# Patient Record
Sex: MALE | Race: OTHER | HISPANIC OR LATINO | Employment: FULL TIME | URBAN - METROPOLITAN AREA
[De-identification: names, ages, dates, MRNs, and addresses within clinical notes are randomized per-mention and may not be internally consistent; named-entity substitution may affect disease eponyms.]

---

## 2023-07-18 ENCOUNTER — HOSPITAL ENCOUNTER (INPATIENT)
Facility: HOSPITAL | Age: 18
LOS: 1 days | Discharge: HOME | DRG: 684 | End: 2023-07-19
Attending: INTERNAL MEDICINE | Admitting: INTERNAL MEDICINE

## 2023-07-18 DIAGNOSIS — R10.84 GENERALIZED ABDOMINAL PAIN: ICD-10-CM

## 2023-07-18 DIAGNOSIS — R11.2 NAUSEA AND VOMITING, UNSPECIFIED VOMITING TYPE: Primary | ICD-10-CM

## 2023-07-18 DIAGNOSIS — T67.5XXA HEAT EXHAUSTION, INITIAL ENCOUNTER: ICD-10-CM

## 2023-07-18 DIAGNOSIS — N17.9 AKI (ACUTE KIDNEY INJURY): ICD-10-CM

## 2023-07-18 LAB
ALBUMIN SERPL-MCNC: 5.7 G/DL (ref 3.5–5)
ALBUMIN/GLOB SERPL: 1.7 RATIO (ref 1.1–2)
ALP SERPL-CCNC: 91 UNIT/L
ALT SERPL-CCNC: 16 UNIT/L (ref 0–55)
AMPHET UR QL SCN: NEGATIVE
APPEARANCE UR: CLEAR
AST SERPL-CCNC: 17 UNIT/L (ref 5–34)
BACTERIA #/AREA URNS AUTO: ABNORMAL /HPF
BARBITURATE SCN PRESENT UR: NEGATIVE
BASOPHILS # BLD AUTO: 0.07 X10(3)/MCL
BASOPHILS NFR BLD AUTO: 0.4 %
BENZODIAZ UR QL SCN: NEGATIVE
BILIRUB UR QL STRIP.AUTO: ABNORMAL
BILIRUBIN DIRECT+TOT PNL SERPL-MCNC: 0.7 MG/DL (ref 0–?)
BILIRUBIN DIRECT+TOT PNL SERPL-MCNC: 2.7 MG/DL
BUN SERPL-MCNC: 23 MG/DL (ref 8.9–20.6)
CALCIUM SERPL-MCNC: 10.6 MG/DL (ref 8.4–10.2)
CANNABINOIDS UR QL SCN: POSITIVE
CAOX CRY URNS QL MICRO: ABNORMAL /HPF
CHLORIDE SERPL-SCNC: 97 MMOL/L (ref 98–107)
CK SERPL-CCNC: 270 U/L (ref 30–200)
CO2 SERPL-SCNC: 23 MMOL/L (ref 22–29)
COCAINE UR QL SCN: NEGATIVE
COLOR UR: YELLOW
CREAT SERPL-MCNC: 2.68 MG/DL (ref 0.73–1.18)
EOSINOPHIL # BLD AUTO: 0 X10(3)/MCL (ref 0–0.9)
EOSINOPHIL NFR BLD AUTO: 0 %
ERYTHROCYTE [DISTWIDTH] IN BLOOD BY AUTOMATED COUNT: 12.4 % (ref 11.5–17)
FENTANYL UR QL SCN: NEGATIVE
GFR SERPLBLD CREATININE-BSD FMLA CKD-EPI: 34 MLS/MIN/1.73/M2
GLOBULIN SER-MCNC: 3.4 GM/DL (ref 2.4–3.5)
GLUCOSE SERPL-MCNC: 151 MG/DL (ref 74–100)
GLUCOSE UR QL STRIP.AUTO: NEGATIVE
GRAN CASTS URNS QL MICRO: ABNORMAL /LPF
HCT VFR BLD AUTO: 50.6 % (ref 42–52)
HGB BLD-MCNC: 17.9 G/DL (ref 14–18)
HYALINE CASTS URNS QL MICRO: ABNORMAL /LPF
IMM GRANULOCYTES # BLD AUTO: 0.09 X10(3)/MCL (ref 0–0.04)
IMM GRANULOCYTES NFR BLD AUTO: 0.5 %
KETONES UR QL STRIP.AUTO: 15
LEUKOCYTE ESTERASE UR QL STRIP.AUTO: NEGATIVE
LIPASE SERPL-CCNC: 31 U/L
LYMPHOCYTES # BLD AUTO: 0.76 X10(3)/MCL (ref 0.6–4.6)
LYMPHOCYTES NFR BLD AUTO: 4.2 %
MCH RBC QN AUTO: 28.7 PG (ref 27–31)
MCHC RBC AUTO-ENTMCNC: 35.4 G/DL (ref 33–36)
MCV RBC AUTO: 81.2 FL (ref 80–94)
MDMA UR QL SCN: NEGATIVE
MONOCYTES # BLD AUTO: 0.52 X10(3)/MCL (ref 0.1–1.3)
MONOCYTES NFR BLD AUTO: 2.9 %
NEUTROPHILS # BLD AUTO: 16.77 X10(3)/MCL (ref 2.1–9.2)
NEUTROPHILS NFR BLD AUTO: 92 %
NITRITE UR QL STRIP.AUTO: NEGATIVE
NRBC BLD AUTO-RTO: 0 %
OPIATES UR QL SCN: NEGATIVE
PCP UR QL: NEGATIVE
PH UR STRIP.AUTO: 5.5 [PH]
PH UR: 5.5 [PH] (ref 3–11)
PLATELET # BLD AUTO: 249 X10(3)/MCL (ref 130–400)
PMV BLD AUTO: 10.1 FL (ref 7.4–10.4)
POTASSIUM SERPL-SCNC: 4.1 MMOL/L (ref 3.5–5.1)
PROT SERPL-MCNC: 9.1 GM/DL (ref 6.4–8.3)
PROT UR QL STRIP.AUTO: 100
RBC # BLD AUTO: 6.23 X10(6)/MCL (ref 4.7–6.1)
RBC #/AREA URNS AUTO: ABNORMAL /HPF
RBC UR QL AUTO: ABNORMAL
SODIUM SERPL-SCNC: 140 MMOL/L (ref 136–145)
SP GR UR STRIP.AUTO: 1.02
SQUAMOUS #/AREA URNS AUTO: ABNORMAL /HPF
UROBILINOGEN UR STRIP-ACNC: 0.2
WBC # SPEC AUTO: 18.21 X10(3)/MCL (ref 4.5–11.5)
WBC #/AREA URNS AUTO: ABNORMAL /HPF

## 2023-07-18 PROCEDURE — 81003 URINALYSIS AUTO W/O SCOPE: CPT

## 2023-07-18 PROCEDURE — 82248 BILIRUBIN DIRECT: CPT

## 2023-07-18 PROCEDURE — 25500020 PHARM REV CODE 255

## 2023-07-18 PROCEDURE — 80053 COMPREHEN METABOLIC PANEL: CPT

## 2023-07-18 PROCEDURE — 83690 ASSAY OF LIPASE: CPT

## 2023-07-18 PROCEDURE — 80307 DRUG TEST PRSMV CHEM ANLYZR: CPT

## 2023-07-18 PROCEDURE — 82550 ASSAY OF CK (CPK): CPT

## 2023-07-18 PROCEDURE — 99285 EMERGENCY DEPT VISIT HI MDM: CPT | Mod: 25

## 2023-07-18 PROCEDURE — 63600175 PHARM REV CODE 636 W HCPCS: Performed by: STUDENT IN AN ORGANIZED HEALTH CARE EDUCATION/TRAINING PROGRAM

## 2023-07-18 PROCEDURE — 96374 THER/PROPH/DIAG INJ IV PUSH: CPT

## 2023-07-18 PROCEDURE — 11000001 HC ACUTE MED/SURG PRIVATE ROOM

## 2023-07-18 PROCEDURE — 85025 COMPLETE CBC W/AUTO DIFF WBC: CPT

## 2023-07-18 PROCEDURE — 25000003 PHARM REV CODE 250

## 2023-07-18 PROCEDURE — 63600175 PHARM REV CODE 636 W HCPCS

## 2023-07-18 PROCEDURE — 96361 HYDRATE IV INFUSION ADD-ON: CPT

## 2023-07-18 RX ORDER — SODIUM CHLORIDE, SODIUM LACTATE, POTASSIUM CHLORIDE, CALCIUM CHLORIDE 600; 310; 30; 20 MG/100ML; MG/100ML; MG/100ML; MG/100ML
1000 INJECTION, SOLUTION INTRAVENOUS
Status: COMPLETED | OUTPATIENT
Start: 2023-07-18 | End: 2023-07-19

## 2023-07-18 RX ORDER — SODIUM CHLORIDE 9 MG/ML
1000 INJECTION, SOLUTION INTRAVENOUS
Status: COMPLETED | OUTPATIENT
Start: 2023-07-18 | End: 2023-07-18

## 2023-07-18 RX ORDER — SODIUM CHLORIDE 9 MG/ML
1000 INJECTION, SOLUTION INTRAVENOUS
Status: DISCONTINUED | OUTPATIENT
Start: 2023-07-18 | End: 2023-07-18

## 2023-07-18 RX ORDER — ONDANSETRON 2 MG/ML
4 INJECTION INTRAMUSCULAR; INTRAVENOUS
Status: COMPLETED | OUTPATIENT
Start: 2023-07-18 | End: 2023-07-18

## 2023-07-18 RX ADMIN — SODIUM CHLORIDE, POTASSIUM CHLORIDE, SODIUM LACTATE AND CALCIUM CHLORIDE 1000 ML: 600; 310; 30; 20 INJECTION, SOLUTION INTRAVENOUS at 10:07

## 2023-07-18 RX ADMIN — IOHEXOL 100 ML: 350 INJECTION, SOLUTION INTRAVENOUS at 09:07

## 2023-07-18 RX ADMIN — SODIUM CHLORIDE 1000 ML: 9 INJECTION, SOLUTION INTRAVENOUS at 09:07

## 2023-07-18 RX ADMIN — ONDANSETRON 4 MG: 2 INJECTION INTRAMUSCULAR; INTRAVENOUS at 09:07

## 2023-07-18 RX ADMIN — SODIUM CHLORIDE 1000 ML: 9 INJECTION, SOLUTION INTRAVENOUS at 10:07

## 2023-07-18 NOTE — Clinical Note
Diagnosis: LOUANN (acute kidney injury) [[id number]]   Admitting Provider:: DEA DE PAZ [[id number]]   Future Attending Provider: DEA DE PAZ [[id number]]   Reason for IP Medical Treatment  (Clinical interventions that can only be accomplished in the IP setting? ) :: Evaluation and treatment of LOUANN   I certify that Inpatient services for greater than or equal to 2 midnights are medically necessary:: Yes   Plans for Post-Acute care--if anticipated (pick the single best option):: A. No post acute care anticipated at this time   Special Needs:: No Special Needs [1]

## 2023-07-19 VITALS
TEMPERATURE: 98 F | HEIGHT: 67 IN | DIASTOLIC BLOOD PRESSURE: 67 MMHG | WEIGHT: 160 LBS | HEART RATE: 74 BPM | RESPIRATION RATE: 18 BRPM | SYSTOLIC BLOOD PRESSURE: 128 MMHG | OXYGEN SATURATION: 97 % | BODY MASS INDEX: 25.11 KG/M2

## 2023-07-19 PROBLEM — R11.2 NAUSEA & VOMITING: Status: ACTIVE | Noted: 2023-07-19

## 2023-07-19 PROBLEM — T67.5XXA HEAT EXHAUSTION: Status: ACTIVE | Noted: 2023-07-19

## 2023-07-19 PROBLEM — D72.829 LEUKOCYTOSIS: Status: ACTIVE | Noted: 2023-07-19

## 2023-07-19 PROBLEM — N17.9 AKI (ACUTE KIDNEY INJURY): Status: ACTIVE | Noted: 2023-07-19

## 2023-07-19 LAB
ALBUMIN SERPL-MCNC: 4.2 G/DL (ref 3.5–5)
ALBUMIN/GLOB SERPL: 1.8 RATIO (ref 1.1–2)
ALP SERPL-CCNC: 65 UNIT/L
ALT SERPL-CCNC: 11 UNIT/L (ref 0–55)
AST SERPL-CCNC: 15 UNIT/L (ref 5–34)
BASOPHILS # BLD AUTO: 0.06 X10(3)/MCL
BASOPHILS NFR BLD AUTO: 0.5 %
BILIRUBIN DIRECT+TOT PNL SERPL-MCNC: 1.9 MG/DL
BUN SERPL-MCNC: 16.9 MG/DL (ref 8.9–20.6)
CALCIUM SERPL-MCNC: 9.1 MG/DL (ref 8.4–10.2)
CHLORIDE SERPL-SCNC: 106 MMOL/L (ref 98–107)
CK SERPL-CCNC: 371 U/L (ref 30–200)
CO2 SERPL-SCNC: 26 MMOL/L (ref 22–29)
CREAT SERPL-MCNC: 1.28 MG/DL (ref 0.73–1.18)
EOSINOPHIL # BLD AUTO: 0.05 X10(3)/MCL (ref 0–0.9)
EOSINOPHIL NFR BLD AUTO: 0.4 %
ERYTHROCYTE [DISTWIDTH] IN BLOOD BY AUTOMATED COUNT: 12.6 % (ref 11.5–17)
GFR SERPLBLD CREATININE-BSD FMLA CKD-EPI: >60 MLS/MIN/1.73/M2
GLOBULIN SER-MCNC: 2.4 GM/DL (ref 2.4–3.5)
GLUCOSE SERPL-MCNC: 102 MG/DL (ref 74–100)
HCT VFR BLD AUTO: 42.2 % (ref 42–52)
HGB BLD-MCNC: 14.6 G/DL (ref 14–18)
IMM GRANULOCYTES # BLD AUTO: 0.04 X10(3)/MCL (ref 0–0.04)
IMM GRANULOCYTES NFR BLD AUTO: 0.3 %
LYMPHOCYTES # BLD AUTO: 2.29 X10(3)/MCL (ref 0.6–4.6)
LYMPHOCYTES NFR BLD AUTO: 17.4 %
MCH RBC QN AUTO: 29.2 PG (ref 27–31)
MCHC RBC AUTO-ENTMCNC: 34.6 G/DL (ref 33–36)
MCV RBC AUTO: 84.4 FL (ref 80–94)
MONOCYTES # BLD AUTO: 1.25 X10(3)/MCL (ref 0.1–1.3)
MONOCYTES NFR BLD AUTO: 9.5 %
NEUTROPHILS # BLD AUTO: 9.49 X10(3)/MCL (ref 2.1–9.2)
NEUTROPHILS NFR BLD AUTO: 71.9 %
NRBC BLD AUTO-RTO: 0 %
PLATELET # BLD AUTO: 211 X10(3)/MCL (ref 130–400)
PMV BLD AUTO: 10.1 FL (ref 7.4–10.4)
POTASSIUM SERPL-SCNC: 3.6 MMOL/L (ref 3.5–5.1)
PROT SERPL-MCNC: 6.6 GM/DL (ref 6.4–8.3)
RBC # BLD AUTO: 5 X10(6)/MCL (ref 4.7–6.1)
SODIUM SERPL-SCNC: 142 MMOL/L (ref 136–145)
WBC # SPEC AUTO: 13.18 X10(3)/MCL (ref 4.5–11.5)

## 2023-07-19 PROCEDURE — 63600175 PHARM REV CODE 636 W HCPCS: Performed by: INTERNAL MEDICINE

## 2023-07-19 PROCEDURE — 82550 ASSAY OF CK (CPK): CPT | Performed by: STUDENT IN AN ORGANIZED HEALTH CARE EDUCATION/TRAINING PROGRAM

## 2023-07-19 PROCEDURE — 80053 COMPREHEN METABOLIC PANEL: CPT

## 2023-07-19 PROCEDURE — 96376 TX/PRO/DX INJ SAME DRUG ADON: CPT

## 2023-07-19 PROCEDURE — 25000003 PHARM REV CODE 250: Performed by: INTERNAL MEDICINE

## 2023-07-19 PROCEDURE — 96361 HYDRATE IV INFUSION ADD-ON: CPT

## 2023-07-19 PROCEDURE — 85025 COMPLETE CBC W/AUTO DIFF WBC: CPT

## 2023-07-19 RX ORDER — ONDANSETRON 2 MG/ML
4 INJECTION INTRAMUSCULAR; INTRAVENOUS EVERY 6 HOURS PRN
Status: DISCONTINUED | OUTPATIENT
Start: 2023-07-19 | End: 2023-07-19 | Stop reason: HOSPADM

## 2023-07-19 RX ORDER — OMEPRAZOLE 40 MG/1
40 CAPSULE, DELAYED RELEASE ORAL
Qty: 60 CAPSULE | Refills: 11 | Status: SHIPPED | OUTPATIENT
Start: 2023-07-19 | End: 2024-07-18

## 2023-07-19 RX ORDER — HYDROCODONE BITARTRATE AND ACETAMINOPHEN 5; 325 MG/1; MG/1
1 TABLET ORAL EVERY 6 HOURS PRN
Status: DISCONTINUED | OUTPATIENT
Start: 2023-07-19 | End: 2023-07-19 | Stop reason: HOSPADM

## 2023-07-19 RX ORDER — SODIUM CHLORIDE 9 MG/ML
INJECTION, SOLUTION INTRAVENOUS CONTINUOUS
Status: DISCONTINUED | OUTPATIENT
Start: 2023-07-19 | End: 2023-07-19 | Stop reason: HOSPADM

## 2023-07-19 RX ADMIN — ONDANSETRON 4 MG: 2 INJECTION INTRAMUSCULAR; INTRAVENOUS at 01:07

## 2023-07-19 RX ADMIN — HYDROCODONE BITARTRATE AND ACETAMINOPHEN 1 TABLET: 5; 325 TABLET ORAL at 01:07

## 2023-07-19 RX ADMIN — SODIUM CHLORIDE: 9 INJECTION, SOLUTION INTRAVENOUS at 07:07

## 2023-07-19 RX ADMIN — HYDROCODONE BITARTRATE AND ACETAMINOPHEN 1 TABLET: 5; 325 TABLET ORAL at 07:07

## 2023-07-19 NOTE — PROVIDER PROGRESS NOTES - EMERGENCY DEPT.
Encounter Date: 7/18/2023    ED Physician Progress Notes          Critical Care    Date/Time: 7/18/2023 11:21 PM  Performed by: Joshua Soto MD  Authorized by: Joshua Soto MD   Direct patient critical care time: 45 minutes  Total critical care time (exclusive of procedural time) : 45 minutes  Critical care time was exclusive of separately billable procedures and treating other patients.  Critical care was necessary to treat or prevent imminent or life-threatening deterioration of the following conditions: dehydration and renal failure.  Critical care was time spent personally by me on the following activities: development of treatment plan with patient or surrogate, evaluation of patient's response to treatment, examination of patient, obtaining history from patient or surrogate, ordering and performing treatments and interventions, ordering and review of laboratory studies, ordering and review of radiographic studies and re-evaluation of patient's condition.

## 2023-07-19 NOTE — DISCHARGE SUMMARY
"  Hospital Medicine Discharge Summary    Admit Date: 7/18/2023  Discharge Date and Time: 7/19/20231:14 PM  Admitting Physician: Mandy Ochoa MD   Discharge Attending Physician: Danyel Verdin.  Primary Care Physician: Primary Doctor No  Consults: Dr. Crystal    Discharge Diagnoses:  Active Hospital Problems    Diagnosis  POA    *Heat exhaustion [T67.5XXA]  Yes    LOUANN (acute kidney injury) [N17.9]  Yes    Leukocytosis [D72.829]  Yes    Nausea & vomiting [R11.2]  Yes      Resolved Hospital Problems   No resolved problems to display.        Hospital Course:   Pt admitted with on/off nausea vomiting x1m, heartburn and spasm in stomach usually in morning.  He works usually inside but has been working outside and along with vomiting got overheated not drinking water.  In ED elevated cr, and workup with ct abd with contrast done.  Pt started on ivfs and cr improved.  Pt feels good, will start a ppi and instruct to eat bland diet and avoid foods that aggravate.  Pt also instructed to avoid overheating and drink plenty of water throughout the day.    /67 (BP Location: Left arm, Patient Position: Lying)   Pulse 74   Temp 98.2 °F (36.8 °C)   Resp 18   Ht 5' 7" (1.702 m)   Wt 72.6 kg (160 lb)   SpO2 97%   BMI 25.06 kg/m²    Physical Exam  Cardiovascular:      Rate and Rhythm: Normal rate and regular rhythm.      Heart sounds: Normal heart sounds.   Pulmonary:      Breath sounds: Normal breath sounds.   Abdominal:      General: Bowel sounds are normal.      Palpations: Abdomen is soft.   Musculoskeletal:      Cervical back: Neck supple.   Neurological:      General: No focal deficit present.      Mental Status: He is alert.   Psychiatric:         Mood and Affect: Mood normal.        Procedures Performed: No admission procedures for hospital encounter.     Significant Diagnostic Studies: See Full reports for all details  Admission on 07/18/2023   Component Date Value Ref Range Status    Sodium Level 07/18/2023 140  " 136 - 145 mmol/L Final    Potassium Level 07/18/2023 4.1  3.5 - 5.1 mmol/L Final    Chloride 07/18/2023 97 (L)  98 - 107 mmol/L Final    Carbon Dioxide 07/18/2023 23  22 - 29 mmol/L Final    Glucose Level 07/18/2023 151 (H)  74 - 100 mg/dL Final    Blood Urea Nitrogen 07/18/2023 23.0 (H)  8.9 - 20.6 mg/dL Final    Creatinine 07/18/2023 2.68 (H)  0.73 - 1.18 mg/dL Final    Calcium Level Total 07/18/2023 10.6 (H)  8.4 - 10.2 mg/dL Final    Protein Total 07/18/2023 9.1 (H)  6.4 - 8.3 gm/dL Final    Albumin Level 07/18/2023 5.7 (H)  3.5 - 5.0 g/dL Final    Globulin 07/18/2023 3.4  2.4 - 3.5 gm/dL Final    Albumin/Globulin Ratio 07/18/2023 1.7  1.1 - 2.0 ratio Final    Bilirubin Total 07/18/2023 2.7 (H)  <=1.5 mg/dL Final    Alkaline Phosphatase 07/18/2023 91  <=750 unit/L Final    Alanine Aminotransferase 07/18/2023 16  0 - 55 unit/L Final    Aspartate Aminotransferase 07/18/2023 17  5 - 34 unit/L Final    eGFR 07/18/2023 34  mls/min/1.73/m2 Final    Lipase Level 07/18/2023 31  <=60 U/L Final    Color, UA 07/18/2023 Yellow  Yellow, Light-Yellow, Dark Yellow, Aileen, Straw Final    Appearance, UA 07/18/2023 Clear  Clear Final    Specific Gravity, UA 07/18/2023 1.020   Final    pH, UA 07/18/2023 5.5  5.0 - 8.5 Final    Protein, UA 07/18/2023 100 (A)  Negative Final    Glucose, UA 07/18/2023 Negative  Negative, Normal Final    Ketones, UA 07/18/2023 15 (A)  Negative Final    Blood, UA 07/18/2023 Trace (A)  Negative Final    Bilirubin, UA 07/18/2023 Small (A)  Negative Final    Urobilinogen, UA 07/18/2023 0.2  0.2, 1.0, Normal Final    Nitrites, UA 07/18/2023 Negative  Negative Final    Leukocyte Esterase, UA 07/18/2023 Negative  Negative Final    WBC 07/18/2023 18.21 (H)  4.50 - 11.50 x10(3)/mcL Final    RBC 07/18/2023 6.23 (H)  4.70 - 6.10 x10(6)/mcL Final    Hgb 07/18/2023 17.9  14.0 - 18.0 g/dL Final    Hct 07/18/2023 50.6  42.0 - 52.0 % Final    MCV 07/18/2023 81.2  80.0 - 94.0 fL Final    MCH 07/18/2023 28.7  27.0 -  31.0 pg Final    MCHC 07/18/2023 35.4  33.0 - 36.0 g/dL Final    RDW 07/18/2023 12.4  11.5 - 17.0 % Final    Platelet 07/18/2023 249  130 - 400 x10(3)/mcL Final    MPV 07/18/2023 10.1  7.4 - 10.4 fL Final    Neut % 07/18/2023 92.0  % Final    Lymph % 07/18/2023 4.2  % Final    Mono % 07/18/2023 2.9  % Final    Eos % 07/18/2023 0.0  % Final    Basophil % 07/18/2023 0.4  % Final    Lymph # 07/18/2023 0.76  0.6 - 4.6 x10(3)/mcL Final    Neut # 07/18/2023 16.77 (H)  2.1 - 9.2 x10(3)/mcL Final    Mono # 07/18/2023 0.52  0.1 - 1.3 x10(3)/mcL Final    Eos # 07/18/2023 0.00  0 - 0.9 x10(3)/mcL Final    Baso # 07/18/2023 0.07  <=0.2 x10(3)/mcL Final    IG# 07/18/2023 0.09 (H)  0 - 0.04 x10(3)/mcL Final    IG% 07/18/2023 0.5  % Final    NRBC% 07/18/2023 0.0  % Final    Creatine Kinase 07/18/2023 270 (H)  30 - 200 U/L Final    Bilirubin Direct 07/18/2023 0.7 (H)  0.0 - <0.5 mg/dL Final    Amphetamines, Urine 07/18/2023 Negative  Negative Final    Barbituates, Urine 07/18/2023 Negative  Negative Final    Benzodiazepine, Urine 07/18/2023 Negative  Negative Final    Cannabinoids, Urine 07/18/2023 Positive (A)  Negative Final    Cocaine, Urine 07/18/2023 Negative  Negative Final    Fentanyl, Urine 07/18/2023 Negative  Negative Final    MDMA, Urine 07/18/2023 Negative  Negative Final    Opiates, Urine 07/18/2023 Negative  Negative Final    Phencyclidine, Urine 07/18/2023 Negative  Negative Final    pH, Urine 07/18/2023 5.5  3.0 - 11.0 Final    Bacteria, UA 07/18/2023 Rare  None Seen, Rare, Occasional /HPF Final    Hyaline Casts, UA 07/18/2023 Few (A)  None Seen /LPF Final    Calcium Oxalate Crystals, UA 07/18/2023 Few (A)  None Seen /HPF Final    Granular Casts, UA 07/18/2023 Few (A)  None Seen /LPF Final    RBC, UA 07/18/2023 3-5  None Seen, 0-2, 3-5, 0-5 /HPF Final    WBC, UA 07/18/2023 3-5  None Seen, 0-2, 3-5, 0-5 /HPF Final    Squamous Epithelial Cells, UA 07/18/2023 Few (A)  None Seen, Rare, Occasional, Occ /HPF Final     Sodium Level 07/19/2023 142  136 - 145 mmol/L Final    Potassium Level 07/19/2023 3.6  3.5 - 5.1 mmol/L Final    Chloride 07/19/2023 106  98 - 107 mmol/L Final    Carbon Dioxide 07/19/2023 26  22 - 29 mmol/L Final    Glucose Level 07/19/2023 102 (H)  74 - 100 mg/dL Final    Blood Urea Nitrogen 07/19/2023 16.9  8.9 - 20.6 mg/dL Final    Creatinine 07/19/2023 1.28 (H)  0.73 - 1.18 mg/dL Final    Calcium Level Total 07/19/2023 9.1  8.4 - 10.2 mg/dL Final    Protein Total 07/19/2023 6.6  6.4 - 8.3 gm/dL Final    Albumin Level 07/19/2023 4.2  3.5 - 5.0 g/dL Final    Globulin 07/19/2023 2.4  2.4 - 3.5 gm/dL Final    Albumin/Globulin Ratio 07/19/2023 1.8  1.1 - 2.0 ratio Final    Bilirubin Total 07/19/2023 1.9 (H)  <=1.5 mg/dL Final    Alkaline Phosphatase 07/19/2023 65  <=750 unit/L Final    Alanine Aminotransferase 07/19/2023 11  0 - 55 unit/L Final    Aspartate Aminotransferase 07/19/2023 15  5 - 34 unit/L Final    eGFR 07/19/2023 >60  mls/min/1.73/m2 Final    Creatine Kinase 07/19/2023 371 (H)  30 - 200 U/L Final    WBC 07/19/2023 13.18 (H)  4.50 - 11.50 x10(3)/mcL Final    RBC 07/19/2023 5.00  4.70 - 6.10 x10(6)/mcL Final    Hgb 07/19/2023 14.6  14.0 - 18.0 g/dL Final    Hct 07/19/2023 42.2  42.0 - 52.0 % Final    MCV 07/19/2023 84.4  80.0 - 94.0 fL Final    MCH 07/19/2023 29.2  27.0 - 31.0 pg Final    MCHC 07/19/2023 34.6  33.0 - 36.0 g/dL Final    RDW 07/19/2023 12.6  11.5 - 17.0 % Final    Platelet 07/19/2023 211  130 - 400 x10(3)/mcL Final    MPV 07/19/2023 10.1  7.4 - 10.4 fL Final    Neut % 07/19/2023 71.9  % Final    Lymph % 07/19/2023 17.4  % Final    Mono % 07/19/2023 9.5  % Final    Eos % 07/19/2023 0.4  % Final    Basophil % 07/19/2023 0.5  % Final    Lymph # 07/19/2023 2.29  0.6 - 4.6 x10(3)/mcL Final    Neut # 07/19/2023 9.49 (H)  2.1 - 9.2 x10(3)/mcL Final    Mono # 07/19/2023 1.25  0.1 - 1.3 x10(3)/mcL Final    Eos # 07/19/2023 0.05  0 - 0.9 x10(3)/mcL Final    Baso # 07/19/2023 0.06  <=0.2 x10(3)/mcL  Final    IG# 07/19/2023 0.04  0 - 0.04 x10(3)/mcL Final    IG% 07/19/2023 0.3  % Final    NRBC% 07/19/2023 0.0  % Final        CT Abdomen Pelvis With Contrast    Result Date: 7/18/2023  EXAMINATION: CT ABDOMEN PELVIS WITH CONTRAST CLINICAL HISTORY: Nausea/vomiting; TECHNIQUE: Multidetector IV contrast enhanced axial CT images of the abdomen and pelvis were obtained with coronal and sagittal reconstructions. Automatic exposure control was utilized to reduce the patient's radiation dose. DLP= 239 COMPARISON: No prior imaging available for comparison. FINDINGS: 01. HEPATOBILIARY: No focal hepatic lesion is identified, The gallbladder is normal. 02. SPLEEN: Normal 03. PANCREAS: No focal masses or ductal dilatation. 04. ADRENALS: No adrenal nodules. 05. KIDNEYS: The right kidney demonstrates no stone, hydronephrosis, or hydroureter. No focal mass identified. The left kidney demonstrates no stone, hydronephrosis, or hydroureter. No focal mass identified. 06. LYMPHADENOPATHY/RETROPERITONEUM: There is no retroperitoneal lymphadenopathy. The abdominal aorta is normal in course and caliber. 07. BOWEL: No acute bowel related abnormalities. No evidence of appendiceal inflammation. 08. PELVIC VISCERA: Normal. No pelvic mass. 09. PELVIC LYMPH NODES: No lymphadenopathy. 10. PERITONEUM/ABDOMINAL WALL: No ascites or implant. 11. SKELETAL: No aggressive appearing lytic/blastic lesion. No acute fractures, subluxations or dislocations. 12. LUNG BASES: The visualized lungs are unremarkable.     No acute abnormality identified within the abdomen and pelvis. Electronically signed by: Brannon Benitez Date:    07/18/2023 Time:    21:46      Microbiology Results (last 7 days)       ** No results found for the last 168 hours. **                Medication List        START taking these medications      omeprazole 40 MG capsule  Commonly known as: PRILOSEC  Take 1 capsule (40 mg total) by mouth 2 (two) times daily before meals.                Where to Get Your Medications        These medications were sent to [id number] - [place], [address] AT [facility] & [facility]  [address] [place], [place] [address] [address]      Hours: 24-hours Phone: [phone]   omeprazole 40 MG capsule         Explained in detail to the patient about the discharge plan, medications, and follow-up visits. Pt understands and agrees with the treatment plan  Discharged Condition: stable  Medications Per DC med rec  Activities as tolerated  Follow-up pcp  For further questions contact hospitalist office    Discharge time 30 minutes    For worsening symptoms, chest pain, shortness of breath, increased abdominal pain, high grade fever, stroke or stroke like symptoms, immediately go to the nearest Emergency Room or call 911 as soon as possible.      Danyel Warren M.D, on 7/19/2023. at 1:14 PM.

## 2023-07-19 NOTE — ED PROVIDER NOTES
Encounter Date: 7/18/2023       History     Chief Complaint   Patient presents with    Nausea    Vomiting    Abdominal Pain     Pt to er with n/v x 1 month. Abd pain that started today      17 y.o. male presents to Emergency Department with a chief complaint of vomiting. Symptoms began 1 month ago and have been recurrent since onset. States on today he could not hold any food or liquids down. Associated symptoms include nausea and abdominal pain. Symptoms are aggravated with palpation and there are no alleviating factors. The patient denies CP, SOB, fever, chills, dizziness, or diarrhea. No other reported symptoms at this time      The history is provided by the patient. No  was used.   Emesis   This is a recurrent problem. The current episode started several weeks ago. The problem occurs most days. The problem has been gradually worsening. The emesis has an appearance of stomach contents and bilious material. Associated symptoms include abdominal pain. Pertinent negatives include no chills, no cough, no fever, no headaches and no URI.   Review of patient's allergies indicates:  No Known Allergies  History reviewed. No pertinent past medical history.  History reviewed. No pertinent surgical history.  History reviewed. No pertinent family history.  Social History     Tobacco Use    Smoking status: Every Day    Smokeless tobacco: Never   Substance Use Topics    Alcohol use: Yes    Drug use: Yes     Types: Marijuana     Review of Systems   Constitutional:  Negative for chills, diaphoresis, fatigue and fever.   Respiratory:  Negative for cough, shortness of breath, wheezing and stridor.    Cardiovascular:  Negative for chest pain, palpitations and leg swelling.   Gastrointestinal:  Positive for abdominal pain, nausea and vomiting.   Neurological:  Negative for dizziness, syncope, weakness and headaches.   All other systems reviewed and are negative.    Physical Exam     Initial Vitals [07/18/23 2032]    BP Pulse Resp Temp SpO2   138/84 94 20 98.4 °F (36.9 °C) 98 %      MAP       --         Physical Exam    Nursing note and vitals reviewed.  Constitutional: He appears well-developed and well-nourished. He is not diaphoretic. He is cooperative.  Non-toxic appearance. No distress.   No active vomiting noted.   HENT:   Head: Normocephalic and atraumatic.   Right Ear: External ear normal.   Left Ear: External ear normal.   Nose: Nose normal.   Mouth/Throat: Oropharynx is clear and moist. No oropharyngeal exudate.   Eyes: Conjunctivae and EOM are normal. Pupils are equal, round, and reactive to light.   Neck: Neck supple.   Normal range of motion.  Cardiovascular:  Normal rate, regular rhythm, S1 normal, S2 normal, normal heart sounds, intact distal pulses and normal pulses.           Pulmonary/Chest: Effort normal and breath sounds normal. No respiratory distress. He has no wheezes. He exhibits no tenderness.   Abdominal: Abdomen is soft. Bowel sounds are normal. He exhibits no distension. There is generalized abdominal tenderness.   Musculoskeletal:         General: No tenderness or edema. Normal range of motion.      Cervical back: Normal range of motion and neck supple.     Neurological: He is alert and oriented to person, place, and time. He has normal strength. No sensory deficit. GCS score is 15. GCS eye subscore is 4. GCS verbal subscore is 5. GCS motor subscore is 6.   Skin: Skin is warm and dry. Capillary refill takes less than 2 seconds. No rash noted. No erythema.   Psychiatric: He has a normal mood and affect. Thought content normal.       ED Course   Procedures  Labs Reviewed   COMPREHENSIVE METABOLIC PANEL - Abnormal; Notable for the following components:       Result Value    Chloride 97 (*)     Glucose Level 151 (*)     Blood Urea Nitrogen 23.0 (*)     Creatinine 2.68 (*)     Calcium Level Total 10.6 (*)     Protein Total 9.1 (*)     Albumin Level 5.7 (*)     Bilirubin Total 2.7 (*)     All other  components within normal limits   URINALYSIS, REFLEX TO URINE CULTURE - Abnormal; Notable for the following components:    Protein,  (*)     Ketones, UA 15 (*)     Blood, UA Trace (*)     Bilirubin, UA Small (*)     All other components within normal limits   CBC WITH DIFFERENTIAL - Abnormal; Notable for the following components:    WBC 18.21 (*)     RBC 6.23 (*)     Neut # 16.77 (*)     IG# 0.09 (*)     All other components within normal limits   CK - Abnormal; Notable for the following components:    Creatine Kinase 270 (*)     All other components within normal limits   BILIRUBIN, DIRECT - Abnormal; Notable for the following components:    Bilirubin Direct 0.7 (*)     All other components within normal limits   DRUG SCREEN, URINE (BEAKER) - Abnormal; Notable for the following components:    Cannabinoids, Urine Positive (*)     All other components within normal limits    Narrative:     Cut off concentrations:    Amphetamines - 1000 ng/ml  Barbiturates - 200 ng/ml  Benzodiazepine - 200 ng/ml  Cannabinoids (THC) - 50 ng/ml  Cocaine - 300 ng/ml  Fentanyl - 1.0 ng/ml  MDMA - 500 ng/ml  Opiates - 300 ng/ml   Phencyclidine (PCP) - 25 ng/ml    Specimen submitted for drug analysis and tested for pH and specific gravity in order to evaluate sample integrity. Suspect tampering if specific gravity is <1.003 and/or pH is not within the range of 4.5 - 8.0  False negatives may result form substances such as bleach added to urine.  False positives may result for the presence of a substance with similar chemical structure to the drug or its metabolite.    This test provides only a PRELIMINARY analytical test result. A more specific alternate chemical method must be used in order to obtain a confirmed analytical result. Gas chromatography/mass spectrometry (GC/MS) is the preferred confirmatory method. Other chemical confirmation methods are available. Clinical consideration and professional judgement should be applied to  any drug of abuse test result, particularly when preliminary positive results are used.    Positive results will be confirmed only at the physicians request. Unconfirmed screening results are to be used only for medical purposes (treatment).        URINALYSIS, MICROSCOPIC - Abnormal; Notable for the following components:    Hyaline Casts, UA Few (*)     Calcium Oxalate Crystals, UA Few (*)     Granular Casts, UA Few (*)     Squamous Epithelial Cells, UA Few (*)     All other components within normal limits   LIPASE - Normal   CBC W/ AUTO DIFFERENTIAL    Narrative:     The following orders were created for panel order CBC W/ AUTO DIFFERENTIAL.  Procedure                               Abnormality         Status                     ---------                               -----------         ------                     CBC with Differential[[id number]]        Abnormal            Final result                 Please view results for these tests on the individual orders.          Imaging Results              CT Abdomen Pelvis With Contrast (Final result)  Result time 07/18/23 21:46:13      Final result by Brannon Benitez MD (07/18/23 21:46:13)                   Impression:      No acute abnormality identified within the abdomen and pelvis.      Electronically signed by: Brannon Benitez  Date:    07/18/2023  Time:    21:46               Narrative:    EXAMINATION:  CT ABDOMEN PELVIS WITH CONTRAST    CLINICAL HISTORY:  Nausea/vomiting;    TECHNIQUE:  Multidetector IV contrast enhanced axial CT images of the abdomen and pelvis were obtained with coronal and sagittal reconstructions.    Automatic exposure control was utilized to reduce the patient's radiation dose.    DLP= 239    COMPARISON:  No prior imaging available for comparison.    FINDINGS:  01. HEPATOBILIARY: No focal hepatic lesion is identified, The gallbladder is normal.    02. SPLEEN: Normal    03. PANCREAS: No focal masses or ductal dilatation.    04. ADRENALS: No  adrenal nodules.    05. KIDNEYS: The right kidney demonstrates no stone, hydronephrosis, or hydroureter. No focal mass identified. The left kidney demonstrates no stone, hydronephrosis, or hydroureter. No focal mass identified.    06. LYMPHADENOPATHY/RETROPERITONEUM: There is no retroperitoneal lymphadenopathy. The abdominal aorta is normal in course and caliber.    07. BOWEL: No acute bowel related abnormalities. No evidence of appendiceal inflammation.    08. PELVIC VISCERA: Normal. No pelvic mass.    09. PELVIC LYMPH NODES: No lymphadenopathy.    10. PERITONEUM/ABDOMINAL WALL: No ascites or implant.    11. SKELETAL: No aggressive appearing lytic/blastic lesion. No acute fractures, subluxations or dislocations.    12. LUNG BASES: The visualized lungs are unremarkable.                                       Medications   0.9%  NaCl infusion (has no administration in time range)   0.9%  NaCl infusion (0 mLs Intravenous Stopped 7/18/23 2217)   ondansetron injection 4 mg (4 mg Intravenous Given 7/18/23 2107)   0.9%  NaCl infusion (1,000 mLs Intravenous New Bag 7/18/23 2211)   iohexoL (OMNIPAQUE 350) injection 100 mL (100 mLs Intravenous Given 7/18/23 2143)     Medical Decision Making:   Initial Assessment:   Patient awake, alert, has non-labored breathing, and follows commands appropriately. C/o abdominal pain, nausea, and vomiting. Symptoms have been recurrent for 1 month. 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 NAD noted.   Differential Diagnosis:   Viral Gastroenteritis, Nausea and Vomiting, GERD  Clinical Tests:   Lab Tests: Ordered and Reviewed  Radiological Study: Ordered and Reviewed  ED Management:  Co-morbidities and/or factors adding to the complexity or risk for the patient?: none  Differential diagnoses: Viral Gastroenteritis, Nausea and Vomiting, GERD  Decision to obtain previous or outside records?: I did not review records.   Chart Review (nursing home, outside records, CareEverywhere):  none  Review of RX medications/new RX prescribed by me?: none  Labs/imaging/other tests obtained/considered (risk/benefits of testing discussed): cbc, cmp, lipase, ck, t-bilii, ua, uds, ct  Labs/tests intepretation: Leukocytosis noted likely due to excessive vomiting. Kidney enzymes elevated. CK- 270. T-Bili- 2.7. CT- No acute abnormality identified within the abdomen and pelvis. Informed patient of results.   My independent imaging interpretation: none  Treatment/interventions, IV fluids, IV medications: IV fluids and Zofran given in ER.   Complex management (IV controlled substances, went to OR, DNR, meds requiring monitoring, transfer, etc)?: none  Workup/treatment affected by social determinants of health?:none   Point of care US done/interpretation: none  Consults/radiologist/EMS/social work/family discussion/alternate history: none  Advanced care planning/end of life discussion: none  Shared decision making: Discussed plan of care and interventions with patient. Agreed to and aware of plan of care. Comfortable being admitted.   ETOH/smoking/drug cessation discussion: none  Dispo: Patient admitted to hospital medicine services for further evaluation and treatment.     Other:   I discussed test(s) with the performing physician.  I have discussed this case with another health care provider.           ED Course as of 07/18/23 2233 Tue Jul 18, 2023 2120 WBC(!): 18.21 [JA]   2144 Creatinine(!): 2.68 [JA]   2144 BUN(!): 23.0 [JA]   2144 BILIRUBIN TOTAL(!): 2.7 [JA]   2156 Discussed patient and results with Dr. Soto. Instructed to give additional bolus and admit to hospital medicine services for further evaluation.  [JA]   2213 Hospital medicine paged.  [JA]   2232 Discussed patient with ARGELIA Carty, with hospital medicine. Will admit to their services. Aware that maintenance fluids will be started on patient. No further instruction or recommendations given. [JA]      ED Course User Index  [JA] Awilda COLEMAN  ARGELIA Wiley                   Clinical Impression:   Final diagnoses:  [N17.9] LOUANN (acute kidney injury)  [R11.2] Nausea and vomiting, unspecified vomiting type (Primary)  [R10.84] Generalized abdominal pain        ED Disposition Condition    Admit Stable                Awilda Wiley NP  07/18/23 [id number]

## 2023-07-19 NOTE — H&P
[facility] - Emergency Dept    History & Physical      Patient Name: Krishan Sampson  MRN: [id number]  Admission Date: 7/18/2023  Attending Physician: Mandy Ochoa MD   Primary Care Provider: Primary Doctor No         Patient information was obtained from patient and ER records.     Subjective:     Principal Problem:<principal problem not specified>    Chief Complaint:   Chief Complaint   Patient presents with    Nausea    Vomiting    Abdominal Pain     Pt to er with n/v x 1 month. Abd pain that started today         HPI: The patient is a 17-year-old male with no significant past medical history. He was working out in the yard earlier today without adequate hydration. Patient began to feel nauseated and tired. He's not here in the emergency department. He was found to have a creatinine of 2.6 with a white blood cell count of 18,000. He got a CT scan with contrast unfortunately of the abdomen and pelvis. It is unremarkable but we are admitting to watch his kidney function status post receiving contrast. No evidence of acute rhabdomyolysis.    History reviewed. No pertinent past medical history.    History reviewed. No pertinent surgical history.    Review of patient's allergies indicates:  No Known Allergies    No current facility-administered medications on file prior to encounter.     No current outpatient medications on file prior to encounter.     Family History    None       Tobacco Use    Smoking status: Every Day    Smokeless tobacco: Never   Substance and Sexual Activity    Alcohol use: Yes    Drug use: Yes     Types: Marijuana    Sexual activity: Not on file     Comment: daily     Review of Systems 10pt ROS performned and is negative unless otherwise noted  Objective:     Vital Signs (Most Recent):  Temp: 98.4 °F (36.9 °C) (07/18/23 2032)  Pulse: 73 (07/19/23 0133)  Resp: 20 (07/18/23 2032)  BP: (!) 124/54 (07/19/23 0133)  SpO2: 96 % (07/19/23 0133) Vital Signs (24h Range):  Temp:  [98 °F  "(36.7 °C)-98.4 °F (36.9 °C)] 98.4 °F (36.9 °C)  Pulse:  [73-98] 73  Resp:  [16-20] 20  SpO2:  [96 %-100 %] 96 %  BP: (113-149)/(54-89) 124/54     Weight: 72.6 kg (160 lb)  Body mass index is 25.06 kg/m².    Physical Exam   BP (!) 124/54   Pulse 73   Temp 98.4 °F (36.9 °C) (Oral)   Resp 20   Ht 5' 7" (1.702 m)   Wt 72.6 kg (160 lb)   SpO2 96%   BMI 25.06 kg/m²     General Appearance:  Alert, cooperative, no distress, appears stated age   Head:  Normocephalic, without obvious abnormality, atraumatic   Eyes:  PERRL, conjunctiva/corneas clear, EOM's intact, fundi benign, both eyes   Ears:  Normal TM's and external ear canals, both ears   Nose: Nares normal, septum midline, mucosa normal, no drainage or sinus tenderness   Throat: Lips, mucosa, and tongue normal; teeth and gums normal   Neck: Supple, symmetrical, trachea midline, no adenopathy, thyroid: not enlarged, symmetric, no tenderness/mass/nodules, no carotid bruit or JVD   Back:   Symmetric, no curvature, ROM normal, no CVA tenderness   Lungs:   Clear to auscultation bilaterally, respirations unlabored   Chest Wall:  No tenderness or deformity   Heart:  Regular rate and rhythm, S1, S2 normal, no murmur, rub or gallop   Abdomen:   Soft, non-tender, bowel sounds active all four quadrants,  no masses, no organomegaly   Genitalia:  Normal male   Rectal:  Normal tone, normal prostate, no masses or tenderness;  guaiac negative stool   Extremities: Extremities normal, atraumatic, no cyanosis or edema   Pulses: 2+ and symmetric   Skin: Skin color, texture, turgor normal, no rashes or lesions   Lymph nodes: Cervical, supraclavicular, and axillary nodes normal   Neurologic: Normal         Significant Labs: All pertinent labs within the past 24 hours have been reviewed.  Recent Lab Results         07/18/23 2201 07/18/23  2110        Phencyclidine Negative         Albumin/Globulin Ratio   1.7       Albumin   5.7       Alkaline Phosphatase   91       ALT   16       " Amphetamine Screen, Ur Negative         Appearance, UA Clear         AST   17       Bacteria, UA Rare         Barbiturate Screen, Ur Negative         Baso #   0.07       Basophil %   0.4       Benzodiazepine Screen, Urine Negative         Bilirubin Direct   0.7       BILIRUBIN TOTAL   2.7       Bilirubin, UA Small         BUN   23.0       Ca Oxalate Grace, UA Few         Calcium   10.6       Cannabinoids, Urine Positive         Chloride   97       CO2   23       Cocaine (Metab.) Negative         Color, UA Yellow         CPK   270       Creatinine   2.68       eGFR   34       Eos #   0.00       Eosinophil %   0.0       Fentanyl, Urine Negative         Globulin, Total   3.4       Glucose   151       Glucose, UA Negative         Granular Casts, UA Few         Hematocrit   50.6       Hemoglobin   17.9       Hyaline Casts, UA Few         Immature Grans (Abs)   0.09       Immature Granulocytes   0.5       Ketones, UA 15         Leukocytes, UA Negative         Lipase   31       Lymph #   0.76       LYMPH %   4.2       MCH   28.7       MCHC   35.4       MCV   81.2       MDMA, Urine Negative         Mono #   0.52       Mono %   2.9       MPV   10.1       Neut #   16.77       Neut %   92.0       NITRITE UA Negative         nRBC   0.0       Occult Blood UA Trace         Opiate Scrn, Ur Negative         pH, UA 5.5         pH, Urine 5.5         Platelets   249       Potassium   4.1       PROTEIN TOTAL   9.1       Protein,          RBC   6.23       RBC, UA 3-5         RDW   12.4       Sodium   140       Specific Gravity,UA 1.020         Squam Epithel, UA Few         Urobilinogen, UA 0.2         WBC, UA 3-5         WBC   18.21               Significant Imaging: I have reviewed all pertinent imaging results/findings within the past 24 hours.  I have reviewed and interpreted all pertinent imaging results/findings within the past 24 hours.    Assessment/Plan:     Active Diagnoses:    Diagnosis Date Noted POA    Heat exhaustion  [T67.5XXA] 07/19/2023 Unknown    LOUANN (acute kidney injury) [N17.9] 07/19/2023 Unknown    Leukocytosis [D72.829] 07/19/2023 Unknown     - Aggressive IVF repletion  - Monitor kidney function  - If worsens would consult nephrology  - No evidence of infection.   - No evidnece of rhabdo    This encounter was completed via telemedicine (audio/video) w/ nursing at bedside ot assist w/ clinical exam.  SOC Audio/Visual Equipment is using HIPPA Compliant Web Platform. The patient is located in the hospital and the provider is located off site. This patient is placed in inpatient status under my care.       Participants on Call: Bedside RN, Patient, Physician on Call.   Problems Resolved During this Admission:     VTE Risk Mitigation (From admission, onward)      None              Fabio Cruz MD  Department of Hospital Medicine   [facility] - Emergency Dept

## 2023-10-23 PROBLEM — N17.9 AKI (ACUTE KIDNEY INJURY): Status: RESOLVED | Noted: 2023-07-19 | Resolved: 2023-10-23
